# Patient Record
Sex: MALE | Race: WHITE | URBAN - METROPOLITAN AREA
[De-identification: names, ages, dates, MRNs, and addresses within clinical notes are randomized per-mention and may not be internally consistent; named-entity substitution may affect disease eponyms.]

---

## 2021-07-27 ENCOUNTER — TELEPHONE (OUTPATIENT)
Dept: AUDIOLOGY | Facility: CLINIC | Age: 31
End: 2021-07-27

## 2021-07-27 NOTE — TELEPHONE ENCOUNTER
Reason for Call:  Other Question    Detailed comments: Can he just come in for the fitting if he sends over [facility] info from last hearing testing? Please call back to confirm     Phone Number Patient can be reached at: Home number on file [phone] (home)    Best Time: any    Can we leave a detailed message on this number? YES    Call taken on 7/27/2021 at 3:20 PM by Lynne Patel

## 2021-07-28 NOTE — TELEPHONE ENCOUNTER
I explained the process for [facility] hearing testing/consultation/hearing aids with [facility] as the originator of the requesting and how he should navigate the system to get an appointment so we can take care of him.     -Israel Prajapati CCC-A